# Patient Record
Sex: MALE | Race: WHITE | Employment: FULL TIME | URBAN - METROPOLITAN AREA
[De-identification: names, ages, dates, MRNs, and addresses within clinical notes are randomized per-mention and may not be internally consistent; named-entity substitution may affect disease eponyms.]

---

## 2020-06-08 ENCOUNTER — HOSPITAL ENCOUNTER (OUTPATIENT)
Dept: ULTRASOUND IMAGING | Age: 50
Discharge: HOME | End: 2020-06-08
Payer: COMMERCIAL

## 2020-06-08 PROCEDURE — 76770 US EXAM ABDO BACK WALL COMP: CPT

## 2020-10-05 ENCOUNTER — HOSPITAL ENCOUNTER (OUTPATIENT)
Age: 50
Setting detail: SPECIMEN
Discharge: HOME | End: 2020-10-05
Payer: COMMERCIAL

## 2020-10-05 PROCEDURE — U0002 COVID-19 LAB TEST NON-CDC: HCPCS

## 2020-10-06 LAB
SARS-COV-2: NOT DETECTED
SOURCE: NORMAL

## 2020-11-18 ENCOUNTER — HOSPITAL ENCOUNTER (OUTPATIENT)
Age: 50
Setting detail: SPECIMEN
Discharge: HOME | End: 2020-11-18
Payer: COMMERCIAL

## 2020-11-18 PROCEDURE — U0002 COVID-19 LAB TEST NON-CDC: HCPCS

## 2020-11-19 LAB
SARS-COV-2: NOT DETECTED
SOURCE: NORMAL

## 2020-11-24 ENCOUNTER — HOSPITAL ENCOUNTER (OUTPATIENT)
Age: 50
Discharge: HOME | End: 2020-11-24
Payer: COMMERCIAL

## 2020-11-24 ENCOUNTER — HOSPITAL ENCOUNTER (OUTPATIENT)
Dept: GENERAL RADIOLOGY | Age: 50
Discharge: HOME | End: 2020-11-24
Payer: COMMERCIAL

## 2020-11-24 PROCEDURE — 71046 X-RAY EXAM CHEST 2 VIEWS: CPT

## 2021-04-09 ENCOUNTER — HOSPITAL ENCOUNTER (OUTPATIENT)
Age: 51
Discharge: HOME | End: 2021-04-09
Payer: COMMERCIAL

## 2021-04-09 ENCOUNTER — HOSPITAL ENCOUNTER (OUTPATIENT)
Dept: GENERAL RADIOLOGY | Age: 51
Discharge: HOME | End: 2021-04-09
Payer: COMMERCIAL

## 2021-04-09 DIAGNOSIS — Z87.01 PERSONAL HISTORY OF PNEUMONIA (RECURRENT): ICD-10-CM

## 2021-04-09 DIAGNOSIS — M54.9 DORSALGIA: ICD-10-CM

## 2021-04-09 PROCEDURE — 71046 X-RAY EXAM CHEST 2 VIEWS: CPT

## 2021-09-13 ENCOUNTER — HOSPITAL ENCOUNTER (EMERGENCY)
Age: 51
Discharge: HOME | End: 2021-09-13
Payer: COMMERCIAL

## 2021-09-13 ENCOUNTER — APPOINTMENT (OUTPATIENT)
Dept: GENERAL RADIOLOGY | Age: 51
End: 2021-09-13
Payer: COMMERCIAL

## 2021-09-13 VITALS
SYSTOLIC BLOOD PRESSURE: 122 MMHG | DIASTOLIC BLOOD PRESSURE: 88 MMHG | HEART RATE: 94 BPM | BODY MASS INDEX: 41.52 KG/M2 | OXYGEN SATURATION: 93 % | RESPIRATION RATE: 16 BRPM | TEMPERATURE: 98.1 F | WEIGHT: 290 LBS | HEIGHT: 70 IN

## 2021-09-13 DIAGNOSIS — U07.1 PNEUMONIA DUE TO COVID-19 VIRUS: Primary | ICD-10-CM

## 2021-09-13 DIAGNOSIS — J12.82 PNEUMONIA DUE TO COVID-19 VIRUS: Primary | ICD-10-CM

## 2021-09-13 LAB
ALBUMIN SERPL-MCNC: 4.2 GM/DL (ref 3.4–5)
ALP BLD-CCNC: 65 IU/L (ref 40–129)
ALT SERPL-CCNC: 85 U/L (ref 10–40)
ANION GAP SERPL CALCULATED.3IONS-SCNC: 12 MMOL/L (ref 4–16)
AST SERPL-CCNC: 63 IU/L (ref 15–37)
BASE EXCESS MIXED: 4 (ref 0–1.2)
BASOPHILS ABSOLUTE: 0 K/CU MM
BASOPHILS RELATIVE PERCENT: 0.1 % (ref 0–1)
BILIRUB SERPL-MCNC: 0.7 MG/DL (ref 0–1)
BUN BLDV-MCNC: 21 MG/DL (ref 6–23)
CALCIUM SERPL-MCNC: 8.6 MG/DL (ref 8.3–10.6)
CHLORIDE BLD-SCNC: 97 MMOL/L (ref 99–110)
CO2: 28 MMOL/L (ref 21–32)
COMMENT: ABNORMAL
CREAT SERPL-MCNC: 1.1 MG/DL (ref 0.9–1.3)
D DIMER: <200 NG/ML(DDU)
DIFFERENTIAL TYPE: ABNORMAL
EKG ATRIAL RATE: 87 BPM
EKG DIAGNOSIS: NORMAL
EKG P AXIS: 25 DEGREES
EKG P-R INTERVAL: 164 MS
EKG Q-T INTERVAL: 362 MS
EKG QRS DURATION: 96 MS
EKG QTC CALCULATION (BAZETT): 435 MS
EKG R AXIS: -1 DEGREES
EKG T AXIS: 29 DEGREES
EKG VENTRICULAR RATE: 87 BPM
EOSINOPHILS ABSOLUTE: 0 K/CU MM
EOSINOPHILS RELATIVE PERCENT: 0 % (ref 0–3)
GFR AFRICAN AMERICAN: >60 ML/MIN/1.73M2
GFR NON-AFRICAN AMERICAN: >60 ML/MIN/1.73M2
GLUCOSE BLD-MCNC: 129 MG/DL (ref 70–99)
HCO3 VENOUS: 30.6 MMOL/L (ref 19–25)
HCT VFR BLD CALC: 51.9 % (ref 42–52)
HEMOGLOBIN: 17.1 GM/DL (ref 13.5–18)
IMMATURE NEUTROPHIL %: 0.5 % (ref 0–0.43)
LACTATE: 1.5 MMOL/L (ref 0.4–2)
LYMPHOCYTES ABSOLUTE: 1.8 K/CU MM
LYMPHOCYTES RELATIVE PERCENT: 21.7 % (ref 24–44)
MCH RBC QN AUTO: 29.7 PG (ref 27–31)
MCHC RBC AUTO-ENTMCNC: 32.9 % (ref 32–36)
MCV RBC AUTO: 90.1 FL (ref 78–100)
MONOCYTES ABSOLUTE: 1.5 K/CU MM
MONOCYTES RELATIVE PERCENT: 18.2 % (ref 0–4)
NUCLEATED RBC %: 0 %
O2 SAT, VEN: 64.7 % (ref 50–70)
PCO2, VEN: 53 MMHG (ref 38–52)
PDW BLD-RTO: 13.1 % (ref 11.7–14.9)
PH VENOUS: 7.37 (ref 7.32–7.42)
PLATELET # BLD: 176 K/CU MM (ref 140–440)
PMV BLD AUTO: 11.1 FL (ref 7.5–11.1)
PO2, VEN: 34 MMHG (ref 28–48)
POTASSIUM SERPL-SCNC: 3.9 MMOL/L (ref 3.5–5.1)
PRO-BNP: 81.16 PG/ML
RBC # BLD: 5.76 M/CU MM (ref 4.6–6.2)
SEGMENTED NEUTROPHILS ABSOLUTE COUNT: 4.8 K/CU MM
SEGMENTED NEUTROPHILS RELATIVE PERCENT: 59.5 % (ref 36–66)
SODIUM BLD-SCNC: 137 MMOL/L (ref 135–145)
TOTAL IMMATURE NEUTOROPHIL: 0.04 K/CU MM
TOTAL NUCLEATED RBC: 0 K/CU MM
TOTAL PROTEIN: 7.3 GM/DL (ref 6.4–8.2)
TROPONIN T: <0.01 NG/ML
WBC # BLD: 8.1 K/CU MM (ref 4–10.5)

## 2021-09-13 PROCEDURE — 82805 BLOOD GASES W/O2 SATURATION: CPT

## 2021-09-13 PROCEDURE — 71046 X-RAY EXAM CHEST 2 VIEWS: CPT

## 2021-09-13 PROCEDURE — 85025 COMPLETE CBC W/AUTO DIFF WBC: CPT

## 2021-09-13 PROCEDURE — 93010 ELECTROCARDIOGRAM REPORT: CPT | Performed by: INTERNAL MEDICINE

## 2021-09-13 PROCEDURE — 83880 ASSAY OF NATRIURETIC PEPTIDE: CPT

## 2021-09-13 PROCEDURE — 84484 ASSAY OF TROPONIN QUANT: CPT

## 2021-09-13 PROCEDURE — 85379 FIBRIN DEGRADATION QUANT: CPT

## 2021-09-13 PROCEDURE — 93005 ELECTROCARDIOGRAM TRACING: CPT | Performed by: STUDENT IN AN ORGANIZED HEALTH CARE EDUCATION/TRAINING PROGRAM

## 2021-09-13 PROCEDURE — 83605 ASSAY OF LACTIC ACID: CPT

## 2021-09-13 PROCEDURE — 80053 COMPREHEN METABOLIC PANEL: CPT

## 2021-09-13 PROCEDURE — 36415 COLL VENOUS BLD VENIPUNCTURE: CPT

## 2021-09-13 PROCEDURE — 99284 EMERGENCY DEPT VISIT MOD MDM: CPT

## 2021-09-13 RX ORDER — ALBUTEROL SULFATE 90 UG/1
2 AEROSOL, METERED RESPIRATORY (INHALATION) EVERY 6 HOURS PRN
Qty: 18 G | Refills: 0 | Status: SHIPPED | OUTPATIENT
Start: 2021-09-13

## 2021-09-13 RX ORDER — DOXYCYCLINE HYCLATE 100 MG
100 TABLET ORAL 2 TIMES DAILY
Qty: 14 TABLET | Refills: 0 | Status: SHIPPED | OUTPATIENT
Start: 2021-09-13 | End: 2021-09-20

## 2021-09-13 NOTE — ED PROVIDER NOTES
Kendra ACHARYA Leggett 89 ENCOUNTER      PCP: Wendy Alexis MD    [facility]    Chief Complaint   Patient presents with    Shortness of Breath     covid positive9/10/2021, symptoms started 7 days prior       This patient was not evaluated by the attending physician. I have independently evaluated this patient. HPI    Catia Turner is a 52 y.o. male who presents with continued cough, worsening shortness of breath. Onset 10 days ago. Patient states he was diagnosed with Covid 3 days ago. Patient states he has been on steroids without significant improvement. Patient denies history of asthma or COPD, states he has had associated wheezing. Patient is a previous smoker, no longer smokes. Patient has associated chest pain with coughing. Patient has had associated fevers. Patient denies abdominal pain, vomiting. Patient denies lower extremity swelling. Patient did not receive Covid vaccine. REVIEW OF SYSTEMS    Constitutional: see HPI  HENT:  Denies sore throat or ear pain   Cardiovascular: See HPI  Respiratory:  See HPI. GI:  Denies abdominal pain  :  Denies any urinary symptoms  Musculoskeletal:  + body aches  Skin:  Denies rash  Neurologic:  Denies focal weakness or sensory changes   Lymphatic:  Denies swollen glands     All other review of systems are negative  See HPI and nursing notes for additional information       PAST MEDICAL & SURGICAL HISTORY    Past Medical History:   Diagnosis Date    Hypertension      No past surgical history on file. CURRENT MEDICATIONS    Current Outpatient Rx   Medication Sig Dispense Refill    doxycycline hyclate (VIBRA-TABS) 100 MG tablet Take 1 tablet by mouth 2 times daily for 7 days 14 tablet 0    albuterol sulfate  (90 Base) MCG/ACT inhaler Inhale 2 puffs into the lungs every 6 hours as needed for Wheezing or Shortness of Breath (or cough) Please include spacer with instructions for use.  18 g 0    METFORMIN HCL PO Take 1 tablet by mouth daily      Atorvastatin Calcium (LIPITOR PO) Take 1 tablet by mouth daily      Bisoprolol-Hydrochlorothiazide (ZIAC PO) Take 1 tablet by mouth daily      EPINEPHrine (EPIPEN 2-JANINE) 0.3 MG/0.3ML SOAJ injection Inject 0.3 mLs into the muscle once as needed (severe allergic reaction) Inject into lateral thigh muscle. 2 each 0    aspirin 81 MG tablet Take 81 mg by mouth daily      pantoprazole (PROTONIX) 20 MG tablet Take 20 mg by mouth daily         ALLERGIES    Allergies   Allergen Reactions    Cucumber Extract Anaphylaxis     Fresh cucumber       SOCIAL & FAMILY HISTORY    Social History     Socioeconomic History    Marital status:      Spouse name: Not on file    Number of children: Not on file    Years of education: Not on file    Highest education level: Not on file   Occupational History    Not on file   Tobacco Use    Smoking status: Former Smoker   Substance and Sexual Activity    Alcohol use: Yes     Comment: occ    Drug use: No    Sexual activity: Never   Other Topics Concern    Not on file   Social History Narrative    Not on file     Social Determinants of Health     Financial Resource Strain:     Difficulty of Paying Living Expenses:    Food Insecurity:     Worried About Running Out of Food in the Last Year:     Ran Out of Food in the Last Year:    Transportation Needs:     Lack of Transportation (Medical):      Lack of Transportation (Non-Medical):    Physical Activity:     Days of Exercise per Week:     Minutes of Exercise per Session:    Stress:     Feeling of Stress :    Social Connections:     Frequency of Communication with Friends and Family:     Frequency of Social Gatherings with Friends and Family:     Attends Sikhism Services:     Active Member of Clubs or Organizations:     Attends Club or Organization Meetings:     Marital Status:    Intimate Partner Violence:     Fear of Current or Ex-Partner:     Emotionally Abused:     Physically Abused:     Sexually Abused:      No family history on file. PHYSICAL EXAM    VITAL SIGNS: /82   Pulse 84   Temp 99.1 °F (37.3 °C) (Oral)   Resp 18   Ht 5' 10\" (1.778 m)   Wt 290 lb (131.5 kg)   SpO2 94%   BMI 41.61 kg/m²    Constitutional:  Well developed, well nourished, no acute distress   HENT:  Atraumatic, moist mucus membranes  Neck/Lymphatics: supple, no JVD, no swollen nodes  Respiratory:   Nonlabored breathing.   Lungs mildly decreased bilaterally, no obvious wheezes or rhonchi, no retractions   Cardiovascular: Normal rate and rhythm  GI:  Soft, nontender, normal bowel sounds  Musculoskeletal:  No edema, no acute deformities  Integument:  Skin is warm and dry, no petechiae   Neurologic:  Alert & oriented, no slurred speech  Psych: Pleasant affect, no hallucinations      EKG    ED Course as of Sep 13 1404   Mon Sep 13, 2021   6963 EKG demonstrated normal sinus rhythm with no significant interval abnormalities, T wave flattening aVL otherwise nonremarkable    [YQ]      ED Course User Index  [YQ] Elo Adames MD         LABS:  Results for orders placed or performed during the hospital encounter of 09/13/21   CBC Auto Differential   Result Value Ref Range    WBC 8.1 4.0 - 10.5 K/CU MM    RBC 5.76 4.6 - 6.2 M/CU MM    Hemoglobin 17.1 13.5 - 18.0 GM/DL    Hematocrit 51.9 42 - 52 %    MCV 90.1 78 - 100 FL    MCH 29.7 27 - 31 PG    MCHC 32.9 32.0 - 36.0 %    RDW 13.1 11.7 - 14.9 %    Platelets 000 395 - 771 K/CU MM    MPV 11.1 7.5 - 11.1 FL    Differential Type AUTOMATED DIFFERENTIAL     Segs Relative 59.5 36 - 66 %    Lymphocytes % 21.7 (L) 24 - 44 %    Monocytes % 18.2 (H) 0 - 4 %    Eosinophils % 0.0 0 - 3 %    Basophils % 0.1 0 - 1 %    Segs Absolute 4.8 K/CU MM    Lymphocytes Absolute 1.8 K/CU MM    Monocytes Absolute 1.5 K/CU MM    Eosinophils Absolute 0.0 K/CU MM    Basophils Absolute 0.0 K/CU MM    Nucleated RBC % 0.0 %    Total Nucleated RBC 0.0 K/CU MM    Total Immature Neutrophil 0.04 K/CU MM Immature Neutrophil % 0.5 (H) 0 - 0.43 %   Comprehensive Metabolic Panel w/ Reflex to MG   Result Value Ref Range    Sodium 137 135 - 145 MMOL/L    Potassium 3.9 3.5 - 5.1 MMOL/L    Chloride 97 (L) 99 - 110 mMol/L    CO2 28 21 - 32 MMOL/L    BUN 21 6 - 23 MG/DL    CREATININE 1.1 0.9 - 1.3 MG/DL    Glucose 129 (H) 70 - 99 MG/DL    Calcium 8.6 8.3 - 10.6 MG/DL    Albumin 4.2 3.4 - 5.0 GM/DL    Total Protein 7.3 6.4 - 8.2 GM/DL    Total Bilirubin 0.7 0.0 - 1.0 MG/DL    ALT 85 (H) 10 - 40 U/L    AST 63 (H) 15 - 37 IU/L    Alkaline Phosphatase 65 40 - 129 IU/L    GFR Non-African American >60 >60 mL/min/1.73m2    GFR African American >60 >60 mL/min/1.73m2    Anion Gap 12 4 - 16   Brain Natriuretic Peptide   Result Value Ref Range    Pro-BNP 81.16 <300 PG/ML   Lactic Acid, Plasma   Result Value Ref Range    Lactate 1.5 0.4 - 2.0 mMOL/L   Blood Gas, Venous   Result Value Ref Range    pH, [place] 7.37 7.32 - 7.42    pCO2, [place] 53 (H) 38 - 52 mmHG    pO2, [place] 34 28 - 48 mmHG    Base Exc, Mixed 4 (H) 0 - 1.2    HCO3, Venous 30.6 (H) 19 - 25 MMOL/L    O2 Sat, [place] 64.7 50 - 70 %    Comment VBG    D-Dimer, Quantitative   Result Value Ref Range    D-Dimer, Quant <200 <230 NG/mL(DDU)   Troponin   Result Value Ref Range    Troponin T <0.010 <0.01 NG/ML   EKG 12 Lead   Result Value Ref Range    Ventricular Rate 87 BPM    Atrial Rate 87 BPM    P-R Interval 164 ms    QRS Duration 96 ms    Q-T Interval 362 ms    QTc Calculation (Bazett) 435 ms    P Axis 25 degrees    R Axis -1 degrees    T Axis 29 degrees    Diagnosis       Normal sinus rhythm  Minimal voltage criteria for LVH, may be normal variant  Nonspecific T wave abnormality  Abnormal ECG  No previous ECGs available             RADIOLOGY/PROCEDURES    XR CHEST (2 VW)   Final Result   Bilateral patchy areas of interstitial opacities, suspicious for atypical   pneumonia given patient history. ED COURSE & MEDICAL DECISION MAKING      Patient presents as above.   Vital signs are stable. See physician note for EKG reading. CBC within normal limits. CMP shows ALT 85, AST is 63 which is similar to previous. BNP is 81. Lactic acid 1.5. D-dimer less than 200. Troponin negative. Rest x-ray shows bilateral patchy areas of interstitial opacities suspicious for atypical pneumonia. I discussed labs and imaging with patient today. Patient ambulated emergency department with pulse ox in oxygen level remains in the mid 90s. I believe patient is appropriate for outpatient follow-up, with his history of previous smoking, wheezing and productive cough I will start him on doxycycline for patchy interstitial opacities noted on x-ray today. I recommend patient complete previously prescribed steroids. Patient provided prescription for rescue inhaler. Patient states will take over-the-counter cold medication as needed. I recommend follow-up with primary care provider in 2 days for recheck. Patient has pulse ox at home and I recommend frequently monitoring pulse ox and return immediately if new or worsening symptoms develop. Clinical  IMPRESSION    1. Pneumonia due to COVID-19 virus          Diagnosis and plan discussed in detail with patient. Patient agrees to return emergency department if symptoms worsen or any new symptoms develop. Comment: Please note this report has been produced using speech recognition software and may contain errors related to that system including errors in grammar, punctuation, and spelling, as well as words and phrases that may be inappropriate. If there are any questions or concerns please feel free to contact the dictating provider for clarification.                          Stella Emery PA-C  09/13/21 [id number]

## 2021-09-13 NOTE — ED PROVIDER NOTES
As PA-in-triage, I performed a medical screening history and physical exam on this patient. HISTORY OF PRESENT ILLNESS  Mingo Eisenberg is a 52 y.o. male presents for chest pain shortness of breath. Symptoms ongoing for last 10 days. Has had outpatient positive COVID-19 test.  Is not progressing shortness of breath, cough and anterior chest burning pain. Intermittent diarrhea. Waxing waning fevers. Patient is not vaccinated for COVID-19. No history of COPD/asthma. Is a non-smoker. No underlying history for coronary disease or stents. .      PHYSICAL EXAM  /82   Pulse 84   Temp 99.1 °F (37.3 °C) (Oral)   Resp 18   Ht 5' 10\" (1.778 m)   Wt 290 lb (131.5 kg)   SpO2 94%   BMI 41.61 kg/m²     On exam, the patient appears well-hydrated, well-nourished, and fatigued, frequent dry cough. Mucous membranes are moist. Speech is clear. Breathing is mildly labored. Skin is dry. Mental status is normal. The patient has normal gait, moves all extremities, and is without facial droop. Labs, EKG, imaging ordered at triage. Please see ED provider's note for patient complete ED evaluation, labs/imaging interpretation and final disposition/clinical impression.          Maria Eugenia Garcia PA-C  09/13/21 [id number]

## 2021-09-13 NOTE — ED TRIAGE NOTES
Patient complains of shortness of breath and inability to stop coughing since being diagnosed with covid.

## 2021-09-14 ENCOUNTER — CARE COORDINATION (OUTPATIENT)
Dept: CARE COORDINATION | Age: 51
End: 2021-09-14

## 2022-04-06 ENCOUNTER — HOSPITAL ENCOUNTER (OUTPATIENT)
Dept: CT IMAGING | Age: 52
Discharge: HOME | End: 2022-04-06
Payer: COMMERCIAL

## 2022-04-06 DIAGNOSIS — R10.9 RIGHT FLANK PAIN: ICD-10-CM

## 2022-04-06 PROCEDURE — 74176 CT ABD & PELVIS W/O CONTRAST: CPT

## 2022-04-18 ENCOUNTER — HOSPITAL ENCOUNTER (OUTPATIENT)
Age: 52
Discharge: HOME | End: 2022-04-18
Payer: COMMERCIAL

## 2022-04-18 LAB
CHOLESTEROL: 145 MG/DL
HDLC SERPL-MCNC: 33 MG/DL
LDL CHOLESTEROL CALCULATED: 88 MG/DL
TRIGL SERPL-MCNC: 121 MG/DL

## 2022-04-18 PROCEDURE — 36415 COLL VENOUS BLD VENIPUNCTURE: CPT

## 2022-04-18 PROCEDURE — 80061 LIPID PANEL: CPT
